# Patient Record
Sex: MALE | Race: WHITE | Employment: UNEMPLOYED | URBAN - METROPOLITAN AREA
[De-identification: names, ages, dates, MRNs, and addresses within clinical notes are randomized per-mention and may not be internally consistent; named-entity substitution may affect disease eponyms.]

---

## 2022-03-14 ENCOUNTER — APPOINTMENT (OUTPATIENT)
Dept: GENERAL RADIOLOGY | Age: 57
DRG: 241 | End: 2022-03-14
Attending: EMERGENCY MEDICINE
Payer: MEDICAID

## 2022-03-14 ENCOUNTER — APPOINTMENT (OUTPATIENT)
Dept: CT IMAGING | Age: 57
DRG: 241 | End: 2022-03-14
Attending: EMERGENCY MEDICINE
Payer: MEDICAID

## 2022-03-14 ENCOUNTER — APPOINTMENT (OUTPATIENT)
Dept: ULTRASOUND IMAGING | Age: 57
DRG: 241 | End: 2022-03-14
Attending: FAMILY MEDICINE
Payer: MEDICAID

## 2022-03-14 ENCOUNTER — APPOINTMENT (OUTPATIENT)
Dept: CT IMAGING | Age: 57
DRG: 241 | End: 2022-03-14
Attending: FAMILY MEDICINE
Payer: MEDICAID

## 2022-03-14 ENCOUNTER — HOSPITAL ENCOUNTER (INPATIENT)
Age: 57
LOS: 2 days | Discharge: HOME | DRG: 241 | End: 2022-03-16
Attending: EMERGENCY MEDICINE | Admitting: FAMILY MEDICINE
Payer: MEDICAID

## 2022-03-14 DIAGNOSIS — F10.939 ALCOHOL WITHDRAWAL SYNDROME WITH COMPLICATION (HCC): ICD-10-CM

## 2022-03-14 DIAGNOSIS — K92.2 GASTROINTESTINAL HEMORRHAGE, UNSPECIFIED GASTROINTESTINAL HEMORRHAGE TYPE: Primary | ICD-10-CM

## 2022-03-14 LAB
ABO + RH BLD: NORMAL
ALBUMIN SERPL-MCNC: 3.5 G/DL (ref 3.5–5)
ALBUMIN/GLOB SERPL: 0.9 {RATIO} (ref 1.1–2.2)
ALP SERPL-CCNC: 345 U/L (ref 45–117)
ALT SERPL-CCNC: 104 U/L (ref 12–78)
AMMONIA PLAS-SCNC: 45 UMOL/L
AMPHET UR QL SCN: NEGATIVE
ANION GAP SERPL CALC-SCNC: 20 MMOL/L (ref 5–15)
AST SERPL-CCNC: 161 U/L (ref 15–37)
BARBITURATES UR QL SCN: NEGATIVE
BASOPHILS # BLD: 0.1 K/UL (ref 0–0.1)
BASOPHILS NFR BLD: 1 % (ref 0–1)
BENZODIAZ UR QL: NEGATIVE
BILIRUB SERPL-MCNC: 2.3 MG/DL (ref 0.2–1)
BLOOD GROUP ANTIBODIES SERPL: NORMAL
BUN SERPL-MCNC: 10 MG/DL (ref 6–20)
BUN/CREAT SERPL: 11 (ref 12–20)
CALCIUM SERPL-MCNC: 8.5 MG/DL (ref 8.5–10.1)
CANNABINOIDS UR QL SCN: NEGATIVE
CHLORIDE SERPL-SCNC: 91 MMOL/L (ref 97–108)
CO2 SERPL-SCNC: 22 MMOL/L (ref 21–32)
COCAINE UR QL SCN: NEGATIVE
COMMENT, HOLDF: NORMAL
CREAT SERPL-MCNC: 0.94 MG/DL (ref 0.7–1.3)
DIFFERENTIAL METHOD BLD: ABNORMAL
DRUG SCRN COMMENT,DRGCM: NORMAL
EOSINOPHIL # BLD: 0 K/UL (ref 0–0.4)
EOSINOPHIL NFR BLD: 0 % (ref 0–7)
ERYTHROCYTE [DISTWIDTH] IN BLOOD BY AUTOMATED COUNT: 17.2 % (ref 11.5–14.5)
EST. AVERAGE GLUCOSE BLD GHB EST-MCNC: 174 MG/DL
ETHANOL SERPL-MCNC: 70 MG/DL
FOLATE SERPL-MCNC: 28 NG/ML (ref 5–21)
GLOBULIN SER CALC-MCNC: 4.1 G/DL (ref 2–4)
GLUCOSE BLD STRIP.AUTO-MCNC: 174 MG/DL (ref 65–117)
GLUCOSE BLD STRIP.AUTO-MCNC: 193 MG/DL (ref 65–117)
GLUCOSE SERPL-MCNC: 293 MG/DL (ref 65–100)
HBA1C MFR BLD: 7.7 % (ref 4–5.6)
HCT VFR BLD AUTO: 37.9 % (ref 36.6–50.3)
HGB BLD-MCNC: 10.8 G/DL (ref 12.1–17)
HGB BLD-MCNC: 11.9 G/DL (ref 12.1–17)
HGB BLD-MCNC: 13.2 G/DL (ref 12.1–17)
IMM GRANULOCYTES # BLD AUTO: 0 K/UL (ref 0–0.04)
IMM GRANULOCYTES NFR BLD AUTO: 0 % (ref 0–0.5)
INR PPP: 1.2 (ref 0.9–1.1)
LIPASE SERPL-CCNC: 174 U/L (ref 73–393)
LIPASE SERPL-CCNC: 179 U/L (ref 73–393)
LYMPHOCYTES # BLD: 0.9 K/UL (ref 0.8–3.5)
LYMPHOCYTES NFR BLD: 16 % (ref 12–49)
MAGNESIUM SERPL-MCNC: 1.2 MG/DL (ref 1.6–2.4)
MAGNESIUM SERPL-MCNC: 1.3 MG/DL (ref 1.6–2.4)
MCH RBC QN AUTO: 32.3 PG (ref 26–34)
MCHC RBC AUTO-ENTMCNC: 34.8 G/DL (ref 30–36.5)
MCV RBC AUTO: 92.7 FL (ref 80–99)
METHADONE UR QL: NEGATIVE
MONOCYTES # BLD: 0.5 K/UL (ref 0–1)
MONOCYTES NFR BLD: 9 % (ref 5–13)
NEUTS SEG # BLD: 4.4 K/UL (ref 1.8–8)
NEUTS SEG NFR BLD: 74 % (ref 32–75)
NRBC # BLD: 0.02 K/UL (ref 0–0.01)
NRBC BLD-RTO: 0.3 PER 100 WBC
OPIATES UR QL: NEGATIVE
PCP UR QL: NEGATIVE
PLATELET # BLD AUTO: 93 K/UL (ref 150–400)
PMV BLD AUTO: 9.4 FL (ref 8.9–12.9)
POTASSIUM SERPL-SCNC: 3.1 MMOL/L (ref 3.5–5.1)
PROT SERPL-MCNC: 7.6 G/DL (ref 6.4–8.2)
PROTHROMBIN TIME: 12.2 SEC (ref 9–11.1)
RBC # BLD AUTO: 4.09 M/UL (ref 4.1–5.7)
RBC MORPH BLD: ABNORMAL
SAMPLES BEING HELD,HOLD: NORMAL
SERVICE CMNT-IMP: ABNORMAL
SERVICE CMNT-IMP: ABNORMAL
SODIUM SERPL-SCNC: 133 MMOL/L (ref 136–145)
SPECIMEN EXP DATE BLD: NORMAL
WBC # BLD AUTO: 5.9 K/UL (ref 4.1–11.1)

## 2022-03-14 PROCEDURE — 83690 ASSAY OF LIPASE: CPT

## 2022-03-14 PROCEDURE — 74177 CT ABD & PELVIS W/CONTRAST: CPT

## 2022-03-14 PROCEDURE — 76705 ECHO EXAM OF ABDOMEN: CPT

## 2022-03-14 PROCEDURE — 74011250636 HC RX REV CODE- 250/636: Performed by: FAMILY MEDICINE

## 2022-03-14 PROCEDURE — 83735 ASSAY OF MAGNESIUM: CPT

## 2022-03-14 PROCEDURE — 82962 GLUCOSE BLOOD TEST: CPT

## 2022-03-14 PROCEDURE — 74011000250 HC RX REV CODE- 250: Performed by: FAMILY MEDICINE

## 2022-03-14 PROCEDURE — 80307 DRUG TEST PRSMV CHEM ANLYZR: CPT

## 2022-03-14 PROCEDURE — 94760 N-INVAS EAR/PLS OXIMETRY 1: CPT

## 2022-03-14 PROCEDURE — 71250 CT THORAX DX C-: CPT

## 2022-03-14 PROCEDURE — C9113 INJ PANTOPRAZOLE SODIUM, VIA: HCPCS | Performed by: FAMILY MEDICINE

## 2022-03-14 PROCEDURE — C9113 INJ PANTOPRAZOLE SODIUM, VIA: HCPCS | Performed by: EMERGENCY MEDICINE

## 2022-03-14 PROCEDURE — 74011250636 HC RX REV CODE- 250/636: Performed by: EMERGENCY MEDICINE

## 2022-03-14 PROCEDURE — 83036 HEMOGLOBIN GLYCOSYLATED A1C: CPT

## 2022-03-14 PROCEDURE — 85018 HEMOGLOBIN: CPT

## 2022-03-14 PROCEDURE — 74011250637 HC RX REV CODE- 250/637: Performed by: FAMILY MEDICINE

## 2022-03-14 PROCEDURE — 99285 EMERGENCY DEPT VISIT HI MDM: CPT

## 2022-03-14 PROCEDURE — 82140 ASSAY OF AMMONIA: CPT

## 2022-03-14 PROCEDURE — 71045 X-RAY EXAM CHEST 1 VIEW: CPT

## 2022-03-14 PROCEDURE — 80053 COMPREHEN METABOLIC PANEL: CPT

## 2022-03-14 PROCEDURE — 85025 COMPLETE CBC W/AUTO DIFF WBC: CPT

## 2022-03-14 PROCEDURE — 74011000636 HC RX REV CODE- 636: Performed by: RADIOLOGY

## 2022-03-14 PROCEDURE — 36415 COLL VENOUS BLD VENIPUNCTURE: CPT

## 2022-03-14 PROCEDURE — 82077 ASSAY SPEC XCP UR&BREATH IA: CPT

## 2022-03-14 PROCEDURE — 96375 TX/PRO/DX INJ NEW DRUG ADDON: CPT

## 2022-03-14 PROCEDURE — 74011000250 HC RX REV CODE- 250: Performed by: EMERGENCY MEDICINE

## 2022-03-14 PROCEDURE — 82746 ASSAY OF FOLIC ACID SERUM: CPT

## 2022-03-14 PROCEDURE — 65660000000 HC RM CCU STEPDOWN

## 2022-03-14 PROCEDURE — 96361 HYDRATE IV INFUSION ADD-ON: CPT

## 2022-03-14 PROCEDURE — 85610 PROTHROMBIN TIME: CPT

## 2022-03-14 PROCEDURE — 86900 BLOOD TYPING SEROLOGIC ABO: CPT

## 2022-03-14 PROCEDURE — 93005 ELECTROCARDIOGRAM TRACING: CPT

## 2022-03-14 PROCEDURE — 96374 THER/PROPH/DIAG INJ IV PUSH: CPT

## 2022-03-14 RX ORDER — CARVEDILOL 25 MG/1
25 TABLET ORAL 2 TIMES DAILY WITH MEALS
COMMUNITY

## 2022-03-14 RX ORDER — ONDANSETRON 2 MG/ML
4 INJECTION INTRAMUSCULAR; INTRAVENOUS
Status: DISCONTINUED | OUTPATIENT
Start: 2022-03-14 | End: 2022-03-16 | Stop reason: HOSPADM

## 2022-03-14 RX ORDER — FENTANYL CITRATE 50 UG/ML
50 INJECTION, SOLUTION INTRAMUSCULAR; INTRAVENOUS ONCE
Status: COMPLETED | OUTPATIENT
Start: 2022-03-14 | End: 2022-03-14

## 2022-03-14 RX ORDER — GUAIFENESIN 100 MG/5ML
81 LIQUID (ML) ORAL DAILY
COMMUNITY

## 2022-03-14 RX ORDER — SODIUM CHLORIDE 0.9 % (FLUSH) 0.9 %
5-40 SYRINGE (ML) INJECTION EVERY 8 HOURS
Status: DISCONTINUED | OUTPATIENT
Start: 2022-03-14 | End: 2022-03-16 | Stop reason: HOSPADM

## 2022-03-14 RX ORDER — INSULIN LISPRO 100 [IU]/ML
4 INJECTION, SOLUTION INTRAVENOUS; SUBCUTANEOUS
COMMUNITY

## 2022-03-14 RX ORDER — CLOPIDOGREL BISULFATE 75 MG/1
75 TABLET ORAL DAILY
COMMUNITY

## 2022-03-14 RX ORDER — MAGNESIUM SULFATE 100 %
4 CRYSTALS MISCELLANEOUS AS NEEDED
Status: DISCONTINUED | OUTPATIENT
Start: 2022-03-14 | End: 2022-03-16 | Stop reason: HOSPADM

## 2022-03-14 RX ORDER — LORAZEPAM 2 MG/ML
1 INJECTION INTRAMUSCULAR
Status: DISCONTINUED | OUTPATIENT
Start: 2022-03-14 | End: 2022-03-15

## 2022-03-14 RX ORDER — SODIUM CHLORIDE 0.9 % (FLUSH) 0.9 %
20 SYRINGE (ML) INJECTION ONCE
Status: COMPLETED | OUTPATIENT
Start: 2022-03-14 | End: 2022-03-14

## 2022-03-14 RX ORDER — PANTOPRAZOLE SODIUM 40 MG/10ML
80 INJECTION, POWDER, LYOPHILIZED, FOR SOLUTION INTRAVENOUS ONCE
Status: COMPLETED | OUTPATIENT
Start: 2022-03-14 | End: 2022-03-14

## 2022-03-14 RX ORDER — ONDANSETRON 2 MG/ML
4 INJECTION INTRAMUSCULAR; INTRAVENOUS
Status: COMPLETED | OUTPATIENT
Start: 2022-03-14 | End: 2022-03-14

## 2022-03-14 RX ORDER — LORAZEPAM 2 MG/ML
1 INJECTION INTRAMUSCULAR ONCE
Status: COMPLETED | OUTPATIENT
Start: 2022-03-14 | End: 2022-03-14

## 2022-03-14 RX ORDER — SODIUM CHLORIDE 0.9 % (FLUSH) 0.9 %
10 SYRINGE (ML) INJECTION EVERY 12 HOURS
Status: DISCONTINUED | OUTPATIENT
Start: 2022-03-14 | End: 2022-03-16 | Stop reason: HOSPADM

## 2022-03-14 RX ORDER — SODIUM CHLORIDE 9 MG/ML
100 INJECTION, SOLUTION INTRAVENOUS CONTINUOUS
Status: DISCONTINUED | OUTPATIENT
Start: 2022-03-14 | End: 2022-03-16 | Stop reason: HOSPADM

## 2022-03-14 RX ORDER — SODIUM CHLORIDE 0.9 % (FLUSH) 0.9 %
5-40 SYRINGE (ML) INJECTION AS NEEDED
Status: DISCONTINUED | OUTPATIENT
Start: 2022-03-14 | End: 2022-03-16 | Stop reason: HOSPADM

## 2022-03-14 RX ORDER — MORPHINE SULFATE 2 MG/ML
1 INJECTION, SOLUTION INTRAMUSCULAR; INTRAVENOUS ONCE
Status: COMPLETED | OUTPATIENT
Start: 2022-03-14 | End: 2022-03-14

## 2022-03-14 RX ORDER — CARVEDILOL 12.5 MG/1
25 TABLET ORAL 2 TIMES DAILY WITH MEALS
Status: DISCONTINUED | OUTPATIENT
Start: 2022-03-14 | End: 2022-03-16 | Stop reason: HOSPADM

## 2022-03-14 RX ORDER — INSULIN LISPRO 100 [IU]/ML
INJECTION, SOLUTION INTRAVENOUS; SUBCUTANEOUS EVERY 6 HOURS
Status: DISCONTINUED | OUTPATIENT
Start: 2022-03-14 | End: 2022-03-16 | Stop reason: HOSPADM

## 2022-03-14 RX ORDER — LISINOPRIL 20 MG/1
20 TABLET ORAL 2 TIMES DAILY
COMMUNITY

## 2022-03-14 RX ORDER — INSULIN GLARGINE 100 [IU]/ML
16 INJECTION, SOLUTION SUBCUTANEOUS DAILY
COMMUNITY

## 2022-03-14 RX ORDER — OMEPRAZOLE 20 MG/1
20 CAPSULE, DELAYED RELEASE ORAL
COMMUNITY
End: 2022-03-16

## 2022-03-14 RX ORDER — PANTOPRAZOLE SODIUM 40 MG/10ML
40 INJECTION, POWDER, LYOPHILIZED, FOR SOLUTION INTRAVENOUS EVERY 12 HOURS
Status: DISCONTINUED | OUTPATIENT
Start: 2022-03-14 | End: 2022-03-15

## 2022-03-14 RX ORDER — MULTIVITAMIN/IRON/FOLIC ACID 18MG-0.4MG
1 TABLET ORAL DAILY
COMMUNITY

## 2022-03-14 RX ORDER — MORPHINE SULFATE 2 MG/ML
2 INJECTION, SOLUTION INTRAMUSCULAR; INTRAVENOUS ONCE
Status: COMPLETED | OUTPATIENT
Start: 2022-03-14 | End: 2022-03-14

## 2022-03-14 RX ADMIN — ONDANSETRON HYDROCHLORIDE 4 MG: 2 SOLUTION INTRAMUSCULAR; INTRAVENOUS at 13:59

## 2022-03-14 RX ADMIN — FENTANYL CITRATE 50 MCG: 50 INJECTION, SOLUTION INTRAMUSCULAR; INTRAVENOUS at 09:38

## 2022-03-14 RX ADMIN — MORPHINE SULFATE 2 MG: 2 INJECTION, SOLUTION INTRAMUSCULAR; INTRAVENOUS at 20:14

## 2022-03-14 RX ADMIN — Medication 10 ML: at 22:10

## 2022-03-14 RX ADMIN — FOLIC ACID: 5 INJECTION, SOLUTION INTRAMUSCULAR; INTRAVENOUS; SUBCUTANEOUS at 17:52

## 2022-03-14 RX ADMIN — ONDANSETRON HYDROCHLORIDE 4 MG: 2 SOLUTION INTRAMUSCULAR; INTRAVENOUS at 21:27

## 2022-03-14 RX ADMIN — LORAZEPAM 1 MG: 2 INJECTION INTRAMUSCULAR; INTRAVENOUS at 18:48

## 2022-03-14 RX ADMIN — CARVEDILOL 25 MG: 12.5 TABLET, FILM COATED ORAL at 18:47

## 2022-03-14 RX ADMIN — ONDANSETRON HYDROCHLORIDE 4 MG: 2 SOLUTION INTRAMUSCULAR; INTRAVENOUS at 09:23

## 2022-03-14 RX ADMIN — Medication 10 ML: at 14:00

## 2022-03-14 RX ADMIN — SODIUM CHLORIDE 1000 ML: 9 INJECTION, SOLUTION INTRAVENOUS at 09:23

## 2022-03-14 RX ADMIN — LORAZEPAM 1 MG: 2 INJECTION INTRAMUSCULAR; INTRAVENOUS at 12:20

## 2022-03-14 RX ADMIN — IOPAMIDOL 100 ML: 755 INJECTION, SOLUTION INTRAVENOUS at 11:18

## 2022-03-14 RX ADMIN — SODIUM CHLORIDE, PRESERVATIVE FREE 10 ML: 5 INJECTION INTRAVENOUS at 20:15

## 2022-03-14 RX ADMIN — PANTOPRAZOLE SODIUM 40 MG: 40 INJECTION, POWDER, FOR SOLUTION INTRAVENOUS at 20:14

## 2022-03-14 RX ADMIN — PANTOPRAZOLE SODIUM 80 MG: 40 INJECTION, POWDER, FOR SOLUTION INTRAVENOUS at 09:23

## 2022-03-14 RX ADMIN — LORAZEPAM 1 MG: 2 INJECTION INTRAMUSCULAR; INTRAVENOUS at 23:44

## 2022-03-14 RX ADMIN — SODIUM CHLORIDE 100 ML/HR: 9 INJECTION, SOLUTION INTRAVENOUS at 15:53

## 2022-03-14 RX ADMIN — LORAZEPAM 1 MG: 2 INJECTION INTRAMUSCULAR; INTRAVENOUS at 09:23

## 2022-03-14 RX ADMIN — MORPHINE SULFATE 1 MG: 2 INJECTION, SOLUTION INTRAMUSCULAR; INTRAVENOUS at 13:59

## 2022-03-14 RX ADMIN — SODIUM CHLORIDE, PRESERVATIVE FREE 20 ML: 5 INJECTION INTRAVENOUS at 09:24

## 2022-03-14 NOTE — PROGRESS NOTES
Problem: Falls - Risk of  Goal: *Absence of Falls  Description: Document Shady Diaz Fall Risk and appropriate interventions in the flowsheet.   Outcome: Progressing Towards Goal  Note: Fall Risk Interventions:                                Problem: Patient Education: Go to Patient Education Activity  Goal: Patient/Family Education  Outcome: Progressing Towards Goal

## 2022-03-14 NOTE — ED PROVIDER NOTES
History of coronary disease status post MI and stent, alcoholism, pancreatitis. He presents with complaints of a 15-hour history of hematemesis. He states that he began vomiting last night about 6 PM.  He reports multiple episodes of brownish/maroon-colored emesis that he states is bloody. His stools have been \"a little darker. \"  He reports drinking alcohol daily. His last drink was last evening. He complains of \"severe\" abdominal pain that he attributes to all of the vomiting. He questions whether he could have pancreatitis. He states that he is currently staying with his brother and is from out of town. He denies a history of GI bleeding. He denies a history of previous hospitalizations for alcohol withdrawal.  He states that he was hospitalized once for pancreatitis. He is on Plavix. No past medical history on file. No past surgical history on file. No family history on file. Social History     Socioeconomic History    Marital status: Not on file     Spouse name: Not on file    Number of children: Not on file    Years of education: Not on file    Highest education level: Not on file   Occupational History    Not on file   Tobacco Use    Smoking status: Not on file    Smokeless tobacco: Not on file   Substance and Sexual Activity    Alcohol use: Not on file    Drug use: Not on file    Sexual activity: Not on file   Other Topics Concern    Not on file   Social History Narrative    Not on file     Social Determinants of Health     Financial Resource Strain:     Difficulty of Paying Living Expenses: Not on file   Food Insecurity:     Worried About Running Out of Food in the Last Year: Not on file    Yanelis of Food in the Last Year: Not on file   Transportation Needs:     Lack of Transportation (Medical): Not on file    Lack of Transportation (Non-Medical):  Not on file   Physical Activity:     Days of Exercise per Week: Not on file    Minutes of Exercise per Session: Not on file   Stress:     Feeling of Stress : Not on file   Social Connections:     Frequency of Communication with Friends and Family: Not on file    Frequency of Social Gatherings with Friends and Family: Not on file    Attends Christian Services: Not on file    Active Member of Clubs or Organizations: Not on file    Attends Club or Organization Meetings: Not on file    Marital Status: Not on file   Intimate Partner Violence:     Fear of Current or Ex-Partner: Not on file    Emotionally Abused: Not on file    Physically Abused: Not on file    Sexually Abused: Not on file   Housing Stability:     Unable to Pay for Housing in the Last Year: Not on file    Number of [place] in the Last Year: Not on file    Unstable Housing in the Last Year: Not on file         ALLERGIES: Penicillins    Review of Systems   All other systems reviewed and are negative. Vitals:    03/14/22 0854   BP: (!) 154/100   Pulse: (!) 124   Resp: 25   Temp: 98.3 °F (36.8 °C)   SpO2: 95%   Weight: 80.9 kg (178 lb 5.6 oz)   Height: 5' 9\" (1.753 m)            Physical Exam  Vitals and nursing note reviewed. Constitutional:       Appearance: He is well-developed. Comments: Appears uncomfortable. Tremulous. HENT:      Head: Normocephalic and atraumatic. Eyes:      Conjunctiva/sclera: Conjunctivae normal.   Neck:      Trachea: No tracheal deviation. Cardiovascular:      Rate and Rhythm: Regular rhythm. Tachycardia present. Heart sounds: Normal heart sounds. No murmur heard. No friction rub. No gallop. Pulmonary:      Effort: Pulmonary effort is normal.      Breath sounds: Normal breath sounds. Abdominal:      Palpations: Abdomen is soft. Tenderness: There is generalized abdominal tenderness. Musculoskeletal:         General: No deformity. Cervical back: Neck supple. Skin:     General: Skin is warm and dry. Comments: Tremulous. Neurological:      Mental Status: He is alert. Comments: oriented          MDM       Procedures    EKG: Sinus tachycardia; rate of 125; inferior and anteroseptal Q waves. Carlos Daily MD  9:33 AM    Perfect Serve Consult for Admission  11:48 AM    ED Room Number: ER09/09  Patient Name and age:  Megha Verduzco 60 y.o.  male  Working Diagnosis: Upper GI bleeding/alcohol withdrawal  COVID-19 Suspicion:  no  Sepsis present:  no  Reassessment needed: no  Code Status:  Full Code  Readmission: no  Isolation Requirements:  no  Recommended Level of Care:  step down  Department:[facility] Adult ED - 21   Other: Reports developing hematemesis last night starting about 6 PM.  He is an alcoholic and appears to be in withdrawal.  Hemodynamically stable (tachycardic and hypertensive). Hemoglobin is stable. Protonix, Ativan, IV fluids. Total critical care time spent exclusive of procedures:  34 min. Carlos Daily MD  3:06 PM      Consult note: Dr. Santhosh Barr -will arrange admission.   Carlos Daily MD

## 2022-03-14 NOTE — PROGRESS NOTES
Pharmacist Admission Medication Reconciliation:    Information obtained from: This medication history was obtained from the patient. He appears to be an accurate historian. RxQuery data available¹:  NO insurance claims - but data from other healthcare systems is available    Summary:     No medications were previously on record for this patient ([facility] encounter). ¹RxQuery pharmacy benefit data reflects medications processed through the patient's insurance, however this data does NOT capture whether the medication is currently being taken by the patient. Allergies:  Penicillins    Significant PMH/Disease States: No past medical history on file. Chief Complaint for this Admission:    Chief Complaint   Patient presents with    Abdominal Pain     Prior to Admission Medications:   Prior to Admission Medications   Prescriptions Last Dose Informant Patient Reported? Taking?   aspirin 81 mg chewable tablet 3/12/2022  Yes Yes   Sig: Take 81 mg by mouth daily. carvediloL (Coreg) 25 mg tablet 3/12/2022  Yes Yes   Sig: Take 25 mg by mouth two (2) times daily (with meals). clopidogreL (Plavix) 75 mg tab 3/12/2022  Yes Yes   Sig: Take 75 mg by mouth daily. insulin glargine (Lantus U-100 Insulin) 100 unit/mL injection 3/12/2022  Yes Yes   Si Units by SubCUTAneous route daily. insulin lispro (HumaLOG KwikPen Insulin) 100 unit/mL kwikpen 3/7/2022  Yes Yes   Si Units by SubCUTAneous route Before breakfast, lunch, and dinner. lisinopriL (PRINIVIL, ZESTRIL) 20 mg tablet 3/12/2022  Yes Yes   Sig: Take 20 mg by mouth two (2) times a day. multivitamin-iron-FA, hematinic, (Cerovite Advanced Formula)  mg-mcg tab tablet 3/12/2022  Yes Yes   Sig: Take 1 Tablet by mouth daily. omeprazole (PRILOSEC) 20 mg capsule 3/12/2022  Yes Yes   Sig: Take 20 mg by mouth Daily (before breakfast).       Facility-Administered Medications: None     Thank you for allowing me to participate in this patient's care.  Please call  or  with any questions. Neema Fox, Pharm. D., BCPS, BCPPS

## 2022-03-14 NOTE — ROUTINE PROCESS
TRANSFER - OUT REPORT:    Verbal report given to Gina(name) on Meet Barnes  being transferred to (unit) for routine progression of care       Report consisted of patients Situation, Background, Assessment and   Recommendations(SBAR). Information from the following report(s) SBAR, ED Summary, Procedure Summary, MAR and Recent Results was reviewed with the receiving nurse. Lines:   Peripheral IV 03/14/22 Right Antecubital (Active)   Site Assessment Clean, dry, & intact 03/14/22 0917   Phlebitis Assessment 0 03/14/22 0917   Infiltration Assessment 0 03/14/22 0917   Dressing Status Clean, dry, & intact 03/14/22 0917   Dressing Type Transparent 03/14/22 0917   Hub Color/Line Status Pink 03/14/22 1519        Opportunity for questions and clarification was provided.       Patient transported with:   "[employer]

## 2022-03-14 NOTE — H&P
[address] [facility] Adult  Hospitalist Group  History and Physical    Date of Service:  3/14/2022  Primary Care Provider: None  Source of information: The patient    Chief Complaint: Abdominal Pain      History of Presenting Illness:   Ana Arnold is a 60 y.o. male who presents with nausea vomiting    History was primarily obtained from the patient    Patient reports that he drinks heavy amount of alcohol on a daily basis has history of pancreatitis, last drink was around 430 yesterday, started having some nausea vomiting reports that he has noticed some coffee-ground emesis and noticed that his stool is dark, reports that he is also noticed some fresh blood in his vomitus, denies any fever or chills, denies any lightheaded dizziness or falls, patient came to the ER and was requested to be admitted to the hospitalist service, patient reports he supposed to take aspirin and Plavix on a daily basis    The patient denies any headache, blurry vision, sore throat, trouble swallowing, trouble with speech, chest pain, SOB, cough, fever, chills, , urinary symptoms, constipation, recent travels, sick contacts, focal or generalized neurological symptoms, falls, injuries, rashes, contact with COVID-19 diagnosed patients, , hemoptysis, hematuria, rashes, denies starting any new medications and denies any other concerns or problems besides as mentioned above. REVIEW OF SYSTEMS:  A comprehensive review of systems was negative except for that written in the History of Present Illness. No past medical history on file. No past surgical history on file. Prior to Admission medications    Medication Sig Start Date End Date Taking? Authorizing Provider   aspirin 81 mg chewable tablet Take 81 mg by mouth daily. Yes Provider, Historical   carvediloL (Coreg) 25 mg tablet Take 25 mg by mouth two (2) times daily (with meals). Yes Provider, Historical   clopidogreL (Plavix) 75 mg tab Take 75 mg by mouth daily.    Yes Provider, Historical   insulin glargine (Lantus U-100 Insulin) 100 unit/mL injection 16 Units by SubCUTAneous route daily. Yes Provider, Historical   insulin lispro (HumaLOG KwikPen Insulin) 100 unit/mL kwikpen 4 Units by SubCUTAneous route Before breakfast, lunch, and dinner. Yes Provider, Historical   lisinopriL (PRINIVIL, ZESTRIL) 20 mg tablet Take 20 mg by mouth two (2) times a day. Yes Provider, Historical   multivitamin-iron-FA, hematinic, (Cerovite Advanced Formula)  mg-mcg tab tablet Take 1 Tablet by mouth daily. Yes Provider, Historical   omeprazole (PRILOSEC) 20 mg capsule Take 20 mg by mouth Daily (before breakfast). Yes Provider, Historical     Allergies   Allergen Reactions    Penicillins Anaphylaxis      No family history on file. Social History:       Family and social history were personally reviewed, all pertinent and relevant details are outlined as above. Objective:     Visit Vitals  /89   Pulse (!) 119   Temp 98.4 °F (36.9 °C)   Resp 17   Ht 5' 9\" (1.753 m)   Wt 80.9 kg (178 lb 5.6 oz)   SpO2 98%   BMI 26.34 kg/m²      O2 Device: None (Room air)    PHYSICAL EXAM:   General: Alert, awake, tremulous man  HEENT: PEERL, EOMI, moist mucus membranes  Neck: Supple, no JVD, no meningeal signs  Chest: Clear to auscultation bilaterally   CVS: Tachycardic  Abd: Soft, non-tender, non-distended, +bowel sounds   Ext: No clubbing, no cyanosis, no edema  Neuro/Psych: No focal neurological deficit  Cap refill: Brisk, less than 3 seconds  Pulses: 2+, symmetric in all extremities  Skin: Warm, dry, without rashes or lesions    Data Review: All diagnostic labs and studies have been reviewed.     Abnormal Labs Reviewed   CBC WITH AUTOMATED DIFF - Abnormal; Notable for the following components:       Result Value    RBC 4.09 (*)     RDW 17.2 (*)     PLATELET 93 (*)     NRBC 0.3 (*)     ABSOLUTE NRBC 0.02 (*)     All other components within normal limits   METABOLIC PANEL, COMPREHENSIVE - Abnormal; Notable for the following components:    Sodium 133 (*)     Potassium 3.1 (*)     Chloride 91 (*)     Anion gap 20 (*)     Glucose 293 (*)     BUN/Creatinine ratio 11 (*)     Bilirubin, total 2.3 (*)     ALT (SGPT) 104 (*)     AST (SGOT) 161 (*)     Alk. phosphatase 345 (*)     Globulin 4.1 (*)     A-G Ratio 0.9 (*)     All other components within normal limits   MAGNESIUM - Abnormal; Notable for the following components:    Magnesium 1.2 (*)     All other components within normal limits   ETHYL ALCOHOL - Abnormal; Notable for the following components:    ALCOHOL(ETHYL),SERUM 70 (*)     All other components within normal limits       All Micro Results     None          IMAGING:   CT ABD PELV W CONT   Final Result   Marked hepatic steatosis. No acute abnormality. Punctate nonobstructing right   renal stones. XR CHEST PORT   Final Result   No acute process. ECG/ECHO:    Results for orders placed or performed during the hospital encounter of 03/14/22   EKG, 12 LEAD, INITIAL   Result Value Ref Range    Ventricular Rate 125 BPM    QRS Duration 82 ms    Q-T Interval 418 ms    QTC Calculation (Bezet) 603 ms    Calculated R Axis -126 degrees    Calculated T Axis 21 degrees    Diagnosis        Critical Test Result: Long QTc  Accelerated Junctional rhythm with occasional premature ventricular complexes  Right superior axis deviation  Inferior infarct , age undetermined  Anteroseptal infarct , age undetermined  Abnormal ECG  No previous ECGs available          Assessment:   Given the patient's current clinical presentation, there is a high level of concern for decompensation if discharged from the emergency department. Complex decision making was performed, which includes reviewing the patient's available past medical records, laboratory results, and imaging studies.     GI bleed  Alcohol abuse/alcohol withdrawal  Hypokalemia  Elevated LFTs    Plan:   Patient will be admitted on a telemetry bed  Likely secondary to alcoholic gastritis versus peptic ulcer disease, IV Protonix, n.p.o., IV hydration, H&H every 8 hours, transfuse PRBC as needed, further intervention per hospital course  CIWA protocol, seizure precautions, close monitoring patient appears to be going through withdrawals  Replace potassium  Likely secondary to alcoholic liver disease, right upper quadrant ultrasound, trend, continue to monitor, no obvious signs of pancreatitis        DIET: DIET NPO   ISOLATION PRECAUTIONS: There are currently no Active Isolations  CODE STATUS: Full Code   DVT PROPHYLAXIS: SCDs  FUNCTIONAL STATUS PRIOR TO HOSPITALIZATION: Fully active and ambulatory; able to carry on all self-care without restriction. EARLY MOBILITY ASSESSMENT: Recommend routine ambulation while hospitalized with the assistance of nursing staff  ANTICIPATED DISCHARGE: Greater than 48 hours. Signed By: Chavez Casper MD     March 14, 2022         Please note that this dictation may have been completed with Dragon, the [employer] voice recognition software. Quite often unanticipated grammatical, syntax, homophones, and other interpretive errors are inadvertently transcribed by the computer software. Please disregard these errors. Please excuse any errors that have escaped final proofreading.

## 2022-03-15 ENCOUNTER — ANESTHESIA EVENT (OUTPATIENT)
Dept: ENDOSCOPY | Age: 57
DRG: 241 | End: 2022-03-15
Payer: MEDICAID

## 2022-03-15 ENCOUNTER — ANESTHESIA (OUTPATIENT)
Dept: ENDOSCOPY | Age: 57
DRG: 241 | End: 2022-03-15
Payer: MEDICAID

## 2022-03-15 LAB
ALBUMIN SERPL-MCNC: 2.6 G/DL (ref 3.5–5)
ALBUMIN/GLOB SERPL: 0.8 {RATIO} (ref 1.1–2.2)
ALP SERPL-CCNC: 241 U/L (ref 45–117)
ALT SERPL-CCNC: 63 U/L (ref 12–78)
ANION GAP SERPL CALC-SCNC: 7 MMOL/L (ref 5–15)
AST SERPL-CCNC: 83 U/L (ref 15–37)
BASOPHILS # BLD: 0 K/UL (ref 0–0.1)
BASOPHILS NFR BLD: 1 % (ref 0–1)
BILIRUB SERPL-MCNC: 2.6 MG/DL (ref 0.2–1)
BUN SERPL-MCNC: 10 MG/DL (ref 6–20)
BUN/CREAT SERPL: 15 (ref 12–20)
CALCIUM SERPL-MCNC: 7.3 MG/DL (ref 8.5–10.1)
CALCULATED R AXIS, ECG10: -126 DEGREES
CALCULATED T AXIS, ECG11: 21 DEGREES
CHLORIDE SERPL-SCNC: 95 MMOL/L (ref 97–108)
CO2 SERPL-SCNC: 30 MMOL/L (ref 21–32)
COVID-19 RAPID TEST, COVR: NOT DETECTED
CREAT SERPL-MCNC: 0.68 MG/DL (ref 0.7–1.3)
DIAGNOSIS, 93000: NORMAL
DIFFERENTIAL METHOD BLD: ABNORMAL
EOSINOPHIL # BLD: 0 K/UL (ref 0–0.4)
EOSINOPHIL NFR BLD: 1 % (ref 0–7)
ERYTHROCYTE [DISTWIDTH] IN BLOOD BY AUTOMATED COUNT: 16.4 % (ref 11.5–14.5)
GLOBULIN SER CALC-MCNC: 3.3 G/DL (ref 2–4)
GLUCOSE BLD STRIP.AUTO-MCNC: 180 MG/DL (ref 65–117)
GLUCOSE BLD STRIP.AUTO-MCNC: 193 MG/DL (ref 65–117)
GLUCOSE BLD STRIP.AUTO-MCNC: 225 MG/DL (ref 65–117)
GLUCOSE SERPL-MCNC: 167 MG/DL (ref 65–100)
HCT VFR BLD AUTO: 29.9 % (ref 36.6–50.3)
HGB BLD-MCNC: 10.5 G/DL (ref 12.1–17)
HGB BLD-MCNC: 10.6 G/DL (ref 12.1–17)
IMM GRANULOCYTES # BLD AUTO: 0 K/UL (ref 0–0.04)
IMM GRANULOCYTES NFR BLD AUTO: 1 % (ref 0–0.5)
LYMPHOCYTES # BLD: 0.9 K/UL (ref 0.8–3.5)
LYMPHOCYTES NFR BLD: 27 % (ref 12–49)
MCH RBC QN AUTO: 33.4 PG (ref 26–34)
MCHC RBC AUTO-ENTMCNC: 35.5 G/DL (ref 30–36.5)
MCV RBC AUTO: 94.3 FL (ref 80–99)
MONOCYTES # BLD: 0.3 K/UL (ref 0–1)
MONOCYTES NFR BLD: 10 % (ref 5–13)
NEUTS SEG # BLD: 2 K/UL (ref 1.8–8)
NEUTS SEG NFR BLD: 60 % (ref 32–75)
NRBC # BLD: 0.04 K/UL (ref 0–0.01)
NRBC BLD-RTO: 1.3 PER 100 WBC
PLATELET # BLD AUTO: 48 K/UL (ref 150–400)
PMV BLD AUTO: 9.8 FL (ref 8.9–12.9)
POTASSIUM SERPL-SCNC: 2.8 MMOL/L (ref 3.5–5.1)
PROT SERPL-MCNC: 5.9 G/DL (ref 6.4–8.2)
Q-T INTERVAL, ECG07: 418 MS
QRS DURATION, ECG06: 82 MS
QTC CALCULATION (BEZET), ECG08: 603 MS
RBC # BLD AUTO: 3.17 M/UL (ref 4.1–5.7)
RBC MORPH BLD: ABNORMAL
RBC MORPH BLD: ABNORMAL
SERVICE CMNT-IMP: ABNORMAL
SODIUM SERPL-SCNC: 132 MMOL/L (ref 136–145)
SOURCE, COVRS: NORMAL
VENTRICULAR RATE, ECG03: 125 BPM
WBC # BLD AUTO: 3.2 K/UL (ref 4.1–11.1)

## 2022-03-15 PROCEDURE — 77030021593 HC FCPS BIOP ENDOSC BSC -A: Performed by: INTERNAL MEDICINE

## 2022-03-15 PROCEDURE — 65660000000 HC RM CCU STEPDOWN

## 2022-03-15 PROCEDURE — 85018 HEMOGLOBIN: CPT

## 2022-03-15 PROCEDURE — 74011250636 HC RX REV CODE- 250/636: Performed by: FAMILY MEDICINE

## 2022-03-15 PROCEDURE — 87635 SARS-COV-2 COVID-19 AMP PRB: CPT

## 2022-03-15 PROCEDURE — 80053 COMPREHEN METABOLIC PANEL: CPT

## 2022-03-15 PROCEDURE — 74011250636 HC RX REV CODE- 250/636: Performed by: NURSE ANESTHETIST, CERTIFIED REGISTERED

## 2022-03-15 PROCEDURE — 36415 COLL VENOUS BLD VENIPUNCTURE: CPT

## 2022-03-15 PROCEDURE — 82962 GLUCOSE BLOOD TEST: CPT

## 2022-03-15 PROCEDURE — 74011000250 HC RX REV CODE- 250: Performed by: FAMILY MEDICINE

## 2022-03-15 PROCEDURE — 74011250636 HC RX REV CODE- 250/636: Performed by: NURSE PRACTITIONER

## 2022-03-15 PROCEDURE — 0DJ08ZZ INSPECTION OF UPPER INTESTINAL TRACT, VIA NATURAL OR ARTIFICIAL OPENING ENDOSCOPIC: ICD-10-PCS | Performed by: INTERNAL MEDICINE

## 2022-03-15 PROCEDURE — 74011250637 HC RX REV CODE- 250/637: Performed by: FAMILY MEDICINE

## 2022-03-15 PROCEDURE — 74011250636 HC RX REV CODE- 250/636: Performed by: INTERNAL MEDICINE

## 2022-03-15 PROCEDURE — 76060000031 HC ANESTHESIA FIRST 0.5 HR: Performed by: INTERNAL MEDICINE

## 2022-03-15 PROCEDURE — 2709999900 HC NON-CHARGEABLE SUPPLY: Performed by: INTERNAL MEDICINE

## 2022-03-15 PROCEDURE — 74011250637 HC RX REV CODE- 250/637: Performed by: INTERNAL MEDICINE

## 2022-03-15 PROCEDURE — 85025 COMPLETE CBC W/AUTO DIFF WBC: CPT

## 2022-03-15 PROCEDURE — C9113 INJ PANTOPRAZOLE SODIUM, VIA: HCPCS | Performed by: FAMILY MEDICINE

## 2022-03-15 PROCEDURE — 74011000250 HC RX REV CODE- 250: Performed by: NURSE ANESTHETIST, CERTIFIED REGISTERED

## 2022-03-15 PROCEDURE — 74011000250 HC RX REV CODE- 250: Performed by: INTERNAL MEDICINE

## 2022-03-15 PROCEDURE — 74011636637 HC RX REV CODE- 636/637: Performed by: FAMILY MEDICINE

## 2022-03-15 PROCEDURE — 76040000019: Performed by: INTERNAL MEDICINE

## 2022-03-15 RX ORDER — DEXTROMETHORPHAN/PSEUDOEPHED 2.5-7.5/.8
1.2 DROPS ORAL
Status: DISCONTINUED | OUTPATIENT
Start: 2022-03-15 | End: 2022-03-15 | Stop reason: HOSPADM

## 2022-03-15 RX ORDER — PANTOPRAZOLE SODIUM 40 MG/1
40 TABLET, DELAYED RELEASE ORAL
Status: DISCONTINUED | OUTPATIENT
Start: 2022-03-15 | End: 2022-03-16 | Stop reason: HOSPADM

## 2022-03-15 RX ORDER — POTASSIUM CHLORIDE 14.9 MG/ML
10 INJECTION INTRAVENOUS
Status: COMPLETED | OUTPATIENT
Start: 2022-03-15 | End: 2022-03-15

## 2022-03-15 RX ORDER — FENTANYL CITRATE 50 UG/ML
25-200 INJECTION, SOLUTION INTRAMUSCULAR; INTRAVENOUS
Status: DISCONTINUED | OUTPATIENT
Start: 2022-03-15 | End: 2022-03-15 | Stop reason: HOSPADM

## 2022-03-15 RX ORDER — EPINEPHRINE 0.1 MG/ML
1 INJECTION INTRACARDIAC; INTRAVENOUS
Status: DISCONTINUED | OUTPATIENT
Start: 2022-03-15 | End: 2022-03-15 | Stop reason: HOSPADM

## 2022-03-15 RX ORDER — LIDOCAINE HYDROCHLORIDE 20 MG/ML
INJECTION, SOLUTION EPIDURAL; INFILTRATION; INTRACAUDAL; PERINEURAL AS NEEDED
Status: DISCONTINUED | OUTPATIENT
Start: 2022-03-15 | End: 2022-03-15 | Stop reason: HOSPADM

## 2022-03-15 RX ORDER — PROPOFOL 10 MG/ML
INJECTION, EMULSION INTRAVENOUS AS NEEDED
Status: DISCONTINUED | OUTPATIENT
Start: 2022-03-15 | End: 2022-03-15 | Stop reason: HOSPADM

## 2022-03-15 RX ORDER — SODIUM CHLORIDE 0.9 % (FLUSH) 0.9 %
5-40 SYRINGE (ML) INJECTION AS NEEDED
Status: DISCONTINUED | OUTPATIENT
Start: 2022-03-15 | End: 2022-03-16 | Stop reason: HOSPADM

## 2022-03-15 RX ORDER — LORAZEPAM 2 MG/ML
4 INJECTION INTRAMUSCULAR
Status: DISCONTINUED | OUTPATIENT
Start: 2022-03-15 | End: 2022-03-16 | Stop reason: HOSPADM

## 2022-03-15 RX ORDER — SUCRALFATE 1 G/10ML
1 SUSPENSION ORAL 4 TIMES DAILY
Status: DISCONTINUED | OUTPATIENT
Start: 2022-03-15 | End: 2022-03-15 | Stop reason: CLARIF

## 2022-03-15 RX ORDER — MIDAZOLAM HYDROCHLORIDE 1 MG/ML
.25-5 INJECTION, SOLUTION INTRAMUSCULAR; INTRAVENOUS
Status: DISCONTINUED | OUTPATIENT
Start: 2022-03-15 | End: 2022-03-15 | Stop reason: HOSPADM

## 2022-03-15 RX ORDER — ACETAMINOPHEN 325 MG/1
650 TABLET ORAL
Status: DISCONTINUED | OUTPATIENT
Start: 2022-03-15 | End: 2022-03-16 | Stop reason: HOSPADM

## 2022-03-15 RX ORDER — SODIUM CHLORIDE 0.9 % (FLUSH) 0.9 %
5-40 SYRINGE (ML) INJECTION EVERY 8 HOURS
Status: DISCONTINUED | OUTPATIENT
Start: 2022-03-15 | End: 2022-03-16 | Stop reason: HOSPADM

## 2022-03-15 RX ORDER — SUCRALFATE 1 G/1
1 TABLET ORAL
Status: DISCONTINUED | OUTPATIENT
Start: 2022-03-15 | End: 2022-03-16 | Stop reason: HOSPADM

## 2022-03-15 RX ORDER — SODIUM CHLORIDE 9 MG/ML
INJECTION, SOLUTION INTRAVENOUS
Status: DISCONTINUED | OUTPATIENT
Start: 2022-03-15 | End: 2022-03-15 | Stop reason: HOSPADM

## 2022-03-15 RX ORDER — SODIUM CHLORIDE 9 MG/ML
50 INJECTION, SOLUTION INTRAVENOUS CONTINUOUS
Status: DISPENSED | OUTPATIENT
Start: 2022-03-15 | End: 2022-03-15

## 2022-03-15 RX ORDER — ATROPINE SULFATE 0.1 MG/ML
0.5 INJECTION INTRAVENOUS
Status: DISCONTINUED | OUTPATIENT
Start: 2022-03-15 | End: 2022-03-15 | Stop reason: HOSPADM

## 2022-03-15 RX ORDER — NALOXONE HYDROCHLORIDE 0.4 MG/ML
0.4 INJECTION, SOLUTION INTRAMUSCULAR; INTRAVENOUS; SUBCUTANEOUS
Status: DISCONTINUED | OUTPATIENT
Start: 2022-03-15 | End: 2022-03-15 | Stop reason: HOSPADM

## 2022-03-15 RX ORDER — LORAZEPAM 2 MG/ML
2 INJECTION INTRAMUSCULAR
Status: DISCONTINUED | OUTPATIENT
Start: 2022-03-15 | End: 2022-03-16 | Stop reason: HOSPADM

## 2022-03-15 RX ORDER — FLUMAZENIL 0.1 MG/ML
0.2 INJECTION INTRAVENOUS
Status: DISCONTINUED | OUTPATIENT
Start: 2022-03-15 | End: 2022-03-15 | Stop reason: HOSPADM

## 2022-03-15 RX ADMIN — POTASSIUM CHLORIDE 10 MEQ: 14.9 INJECTION, SOLUTION INTRAVENOUS at 22:51

## 2022-03-15 RX ADMIN — Medication 10 ML: at 21:31

## 2022-03-15 RX ADMIN — SODIUM CHLORIDE, PRESERVATIVE FREE 10 ML: 5 INJECTION INTRAVENOUS at 17:56

## 2022-03-15 RX ADMIN — PANTOPRAZOLE SODIUM 40 MG: 40 INJECTION, POWDER, FOR SOLUTION INTRAVENOUS at 09:00

## 2022-03-15 RX ADMIN — LIDOCAINE HYDROCHLORIDE 100 MG: 20 INJECTION, SOLUTION EPIDURAL; INFILTRATION; INTRACAUDAL; PERINEURAL at 16:54

## 2022-03-15 RX ADMIN — PANTOPRAZOLE SODIUM 40 MG: 40 TABLET, DELAYED RELEASE ORAL at 17:51

## 2022-03-15 RX ADMIN — SUCRALFATE 1 G: 1 TABLET ORAL at 21:07

## 2022-03-15 RX ADMIN — CARVEDILOL 25 MG: 12.5 TABLET, FILM COATED ORAL at 18:03

## 2022-03-15 RX ADMIN — FOLIC ACID: 5 INJECTION, SOLUTION INTRAMUSCULAR; INTRAVENOUS; SUBCUTANEOUS at 17:55

## 2022-03-15 RX ADMIN — PROPOFOL 50 MG: 10 INJECTION, EMULSION INTRAVENOUS at 16:57

## 2022-03-15 RX ADMIN — PROPOFOL 100 MG: 10 INJECTION, EMULSION INTRAVENOUS at 16:54

## 2022-03-15 RX ADMIN — Medication 2 UNITS: at 14:39

## 2022-03-15 RX ADMIN — ONDANSETRON HYDROCHLORIDE 4 MG: 2 SOLUTION INTRAMUSCULAR; INTRAVENOUS at 09:12

## 2022-03-15 RX ADMIN — POTASSIUM CHLORIDE 10 MEQ: 14.9 INJECTION, SOLUTION INTRAVENOUS at 21:29

## 2022-03-15 RX ADMIN — LORAZEPAM 2 MG: 2 INJECTION INTRAMUSCULAR; INTRAVENOUS at 06:01

## 2022-03-15 RX ADMIN — ACETAMINOPHEN 650 MG: 325 TABLET ORAL at 09:00

## 2022-03-15 RX ADMIN — LORAZEPAM 4 MG: 2 INJECTION INTRAMUSCULAR; INTRAVENOUS at 09:06

## 2022-03-15 RX ADMIN — SODIUM CHLORIDE, PRESERVATIVE FREE 10 ML: 5 INJECTION INTRAVENOUS at 21:09

## 2022-03-15 RX ADMIN — POTASSIUM CHLORIDE 10 MEQ: 14.9 INJECTION, SOLUTION INTRAVENOUS at 20:06

## 2022-03-15 RX ADMIN — POTASSIUM CHLORIDE 10 MEQ: 14.9 INJECTION, SOLUTION INTRAVENOUS at 18:57

## 2022-03-15 RX ADMIN — CARVEDILOL 25 MG: 12.5 TABLET, FILM COATED ORAL at 09:00

## 2022-03-15 RX ADMIN — SODIUM CHLORIDE, PRESERVATIVE FREE 10 ML: 5 INJECTION INTRAVENOUS at 09:00

## 2022-03-15 RX ADMIN — ONDANSETRON HYDROCHLORIDE 4 MG: 2 SOLUTION INTRAMUSCULAR; INTRAVENOUS at 21:07

## 2022-03-15 RX ADMIN — Medication 10 ML: at 05:27

## 2022-03-15 RX ADMIN — SODIUM CHLORIDE: 900 INJECTION, SOLUTION INTRAVENOUS at 16:44

## 2022-03-15 RX ADMIN — PROPOFOL 50 MG: 10 INJECTION, EMULSION INTRAVENOUS at 16:55

## 2022-03-15 RX ADMIN — Medication 10 ML: at 14:00

## 2022-03-15 NOTE — PROGRESS NOTES
2000 Received report from [facility] and [facility] care. 2015 Morphine given for generalized lower abdominal pain per pt request.  2130 Zofran given for nausea per pt request. Labs drawn. 2345 Ativan given for CIWA 12, update provided to hospitalist about ciwa scale and prn order. 0530 Labs drawn. 0600 CIWA 10, ativan given, will continue to monitor. 0730 Bedside and Verbal shift change report given to Gi Hutson (oncoming nurse) by Jack Moya (offgoing nurse). Report included the following information SBAR, Kardex, Intake/Output, MAR and Recent Results.

## 2022-03-15 NOTE — ROUTINE PROCESS
Physical Therapy Screening:  Services are not indicated at this time. An InBasket screening referral was triggered for physical therapy based on results obtained during the nursing admission assessment. The patients chart was reviewed and the patient is not appropriate for a skilled therapy evaluation at this time. Please consult physical therapy if any therapy needs arise. Thank you.     Kamilah Jackson, PT

## 2022-03-15 NOTE — PROGRESS NOTES
Patient lost one of his IV access. Patient has banana bag and potassium ordered. I cannot give these medications together. RN tried and Charge RN tried to get an IV. Called CCU to try and get someone to hopefully try an IV, but nobody answered. WIll pass over to night shift.

## 2022-03-15 NOTE — PROGRESS NOTES
Bedside and Verbal shift change report given to SD Best (oncoming nurse) by Mariola Gray (offgoing nurse).  Report included the following information SBAR, Kardex, ED Summary, OR Summary, Procedure Summary, Intake/Output, MAR, Recent Results and Cardiac Rhythm ST.

## 2022-03-15 NOTE — PROGRESS NOTES
[address] [facility] Adult  Hospitalist Group                                                                                          Hospitalist Progress Note  Richar Hawley MD  Answering service: [phone] from in house phone        Date of Service:  3/15/2022  NAME:  Kelli Torres  :  1965  MRN:  [id number]      Admission Summary:   Kelli Torres is a 60 y.o. male who presents with nausea vomiting. Patient reports that he drinks heavy amount of alcohol on a daily basis has history of pancreatitis, last drink was around 430 yesterday, started having some nausea vomiting reports that he has noticed some coffee-ground emesis and noticed that his stool is dark, reports that he is also noticed some fresh blood in his vomitus, denies any fever or chills, denies any lightheaded dizziness or falls, patient came to the ER and was requested to be admitted to the hospitalist service, patient reports he supposed to take aspirin and Plavix on a daily basis       Interval history / Subjective:   Pt with elevated CIWA overnight 22-10, received 12 mg ativan in last 24 hours  No further episodes of vomiting. Oriented but mumbling his words      Assessment & Plan:     Coffee ground emesis  UGIB  Acute blood loss anemia  - H and H every 12 hours if additional bleeding, daily otherwise  - Transfuse for hemoglobin less than 7   - PPI IV BID  - GI consulted, plan EGD today   - NPO for now     Alcohol withdrawal - drinks 1L per day of [place] liquor. High risk of prolonged and severe withdrawal   - CIWA protocol   - PRN ativan for CIWA, patient also with some drug seeking behavior, should also look for objective signs of withdrawal     Likely alcoholic hepatitis - AST:ALT 2:1, improving.    Suspect cirrhosis - low PLT, elevated INR, slightly elevated ammonia  - Start lactulose once GI bleed ruled out   - Check INR in am   - Continue to monitor hepatic panel  - Would benefit from hepatology consult  - RUQ US with hepatic parenchymal disease    Hypokalemia - replace and monitor         Code status: FULL  Prophylaxis: SCDs, due to UGIB  Care Plan discussed with: pt, RN, and consultant   Anticipated Disposition: Likely greater than 48 hours     Hospital Problems  Never Reviewed          Codes Class Noted POA    GI bleed ICD-10-CM: K92.2  ICD-9-CM: 578.9  3/14/2022 Unknown                Review of Systems:   A comprehensive review of systems was negative except for that written in the HPI. Vital Signs:    Last 24hrs VS reviewed since prior progress note. Most recent are:  Visit Vitals  BP (!) 150/95   Pulse 93   Temp 99.8 °F (37.7 °C)   Resp 17   Ht 5' 9\" (1.753 m)   Wt 81.9 kg (180 lb 8.9 oz)   SpO2 98%   BMI 26.66 kg/m²         Intake/Output Summary (Last 24 hours) at 3/15/2022 1659  Last data filed at 3/15/2022 5760  Gross per 24 hour   Intake 1355.42 ml   Output 125 ml   Net 1230.42 ml        Physical Examination:     I had a face to face encounter with this patient and independently examined them on 3/15/2022 as outlined below:          Constitutional:  No acute distress, disheveled   ENT:  Oral mucosa moist, oropharynx benign. Resp:  CTA bilaterally. No wheezing/rhonchi/rales. No accessory muscle use. CV:  Regular rhythm, normal rate, no murmurs, gallops, rubs    GI:  Soft, non distended, non tender. normoactive bowel sounds, no hepatosplenomegaly     Musculoskeletal:  No edema, warm, 2+ pulses throughout    Neurologic:  Moves all extremities.   AAOx3 but slow to respond, mumbeling, CN II-XII reviewed            Data Review:    Review and/or order of clinical lab test  Review and/or order of tests in the radiology section of CPT  Review and/or order of tests in the medicine section of CPT      Labs:     Recent Labs     03/15/22  1225 03/15/22  0526 03/14/22  1434 03/14/22  0913   WBC  --  3.2*  --  5.9   HGB 10.5* 10.6*   < > 13.2   HCT  --  29.9*  --  37.9   PLT  --  48*  --  93*    < > = values in this interval not displayed. Recent Labs     03/15/22  0526 03/14/22  1434 03/14/22 0913   *  --  133*   K 2.8*  --  3.1*   CL 95*  --  91*   CO2 30  --  22   BUN 10  --  10   CREA 0.68*  --  0.94   *  --  293*   CA 7.3*  --  8.5   MG  --  1.3* 1.2*     Recent Labs     03/15/22  0526 03/14/22  1434 03/14/22  0913   ALT 63  --  104*   *  --  345*   TBILI 2.6*  --  2.3*   TP 5.9*  --  7.6   ALB 2.6*  --  3.5   GLOB 3.3  --  4.1*   LPSE  --  179 174     Recent Labs     03/14/22 1434   INR 1.2*   PTP 12.2*      No results for input(s): FE, TIBC, PSAT, FERR in the last 72 hours. Lab Results   Component Value Date/Time    Folate 28.0 (H) 03/14/2022 02:34 PM      No results for input(s): PH, PCO2, PO2 in the last 72 hours. No results for input(s): CPK, CKNDX, TROIQ in the last 72 hours.     No lab exists for component: CPKMB  No results found for: CHOL, CHOLX, CHLST, CHOLV, HDL, HDLP, LDL, LDLC, DLDLP, TGLX, TRIGL, TRIGP, CHHD, CHHDX  Lab Results   Component Value Date/Time    Glucose (POC) 225 (H) 03/15/2022 12:30 PM    Glucose (POC) 180 (H) 03/15/2022 06:05 AM    Glucose (POC) 193 (H) 03/14/2022 11:46 PM    Glucose (POC) 174 (H) 03/14/2022 06:22 PM     No results found for: COLOR, APPRN, SPGRU, REFSG, SAMUEL, PROTU, GLUCU, KETU, BILU, UROU, MAE, LEUKU, GLUKE, EPSU, BACTU, WBCU, RBCU, CASTS, UCRY      Medications Reviewed:     Current Facility-Administered Medications   Medication Dose Route Frequency    LORazepam (ATIVAN) injection 2 mg  2 mg IntraVENous Q1H PRN    LORazepam (ATIVAN) injection 4 mg  4 mg IntraVENous Q1H PRN    acetaminophen (TYLENOL) tablet 650 mg  650 mg Oral Q4H PRN    0.9% sodium chloride infusion  50 mL/hr IntraVENous CONTINUOUS    sodium chloride (NS) flush 5-40 mL  5-40 mL IntraVENous Q8H    sodium chloride (NS) flush 5-40 mL  5-40 mL IntraVENous PRN    midazolam (VERSED) injection 0.25-5 mg  0.25-5 mg IntraVENous Multiple    fentaNYL citrate (PF) injection  mcg   mcg IntraVENous Multiple    naloxone (NARCAN) injection 0.4 mg  0.4 mg IntraVENous Multiple    flumazeniL (ROMAZICON) 0.1 mg/mL injection 0.2 mg  0.2 mg IntraVENous Multiple    simethicone (MYLICON) 32KD/0.9EE oral drops 80 mg  1.2 mL Oral Multiple    atropine injection 0.5 mg  0.5 mg IntraVENous ONCE PRN    EPINEPHrine (ADRENALIN) 0.1 mg/mL syringe 1 mg  1 mg Endoscopically ONCE PRN    potassium chloride 10 mEq in 50 ml IVPB  10 mEq IntraVENous Q1H    carvediloL (COREG) tablet 25 mg  25 mg Oral BID WITH MEALS    sodium chloride (NS) flush 5-40 mL  5-40 mL IntraVENous Q8H    sodium chloride (NS) flush 5-40 mL  5-40 mL IntraVENous PRN    0.9% sodium chloride 1,386 mL with folic acid 1 mg, thiamine 100 mg, mvi (adult no. 4 with vit K) 10 mL infusion   IntraVENous Q24H    0.9% sodium chloride infusion  100 mL/hr IntraVENous CONTINUOUS    ondansetron (ZOFRAN) injection 4 mg  4 mg IntraVENous Q4H PRN    pantoprazole (PROTONIX) injection 40 mg  40 mg IntraVENous Q12H    And    sodium chloride (NS) flush 10 mL  10 mL IntraVENous Q12H    glucose chewable tablet 16 g  4 Tablet Oral PRN    dextrose 10 % infusion 0-250 mL  0-250 mL IntraVENous PRN    glucagon (GLUCAGEN) injection 1 mg  1 mg IntraMUSCular PRN    insulin lispro (HUMALOG) injection   SubCUTAneous Q6H     Facility-Administered Medications Ordered in Other Encounters   Medication Dose Route Frequency    propofoL (DIPRIVAN) 10 mg/mL injection   IntraVENous PRN    lidocaine (PF) (XYLOCAINE) 20 mg/mL (2 %) injection   IntraVENous PRN    0.9% sodium chloride infusion   IntraVENous CONTINUOUS     ______________________________________________________________________  EXPECTED LENGTH OF STAY: 3d 0h  ACTUAL LENGTH OF STAY:          1                 Ryan Jones MD

## 2022-03-15 NOTE — CONSULTS
[address] [facility] Rosa Lees NOTE  [facility] office  [phone] NP in-hospital cell phone M-F until 4:30  After 5pm or on weekends, please call  for physician on call        NAME:  Sharmila Galvez   :   1965   MRN:   [id number]       Referring Physician: Keshia Fleming    Consult Date: 3/15/2022 10:45 AM    Chief Complaint: coffee ground emesis, alcohol abuse     History of Present Illness:  Patient is a 60 y.o. who is seen in consultation at the request of Dr. Judith Soto for coffee ground emesis, alcohol abuse. Medical history as listed below includes alcohol abuse and pancreatitis. He presented for nausea and coffee-ground emesis and abdominal pain. He reports nausea resolved, having pain that he believes is similar to prior episode of pancreatitis. Denies NSAID's. +alcohol. Reports history of EGD/colonscopy unsure when. I have reviewed the emergency room note, hospital admission note, notes by all other clinicians who have seen the patient during this hospitalization to date. I have reviewed the problem list and the reason for this hospitalization. I have reviewed the allergies and the medications the patient was taking at home prior to this hospitalization. PMH:  No past medical history on file. PSH:  No past surgical history on file. Allergies: Allergies   Allergen Reactions    Penicillins Anaphylaxis       Home Medications:  Prior to Admission Medications   Prescriptions Last Dose Informant Patient Reported? Taking?   aspirin 81 mg chewable tablet 3/12/2022  Yes Yes   Sig: Take 81 mg by mouth daily. carvediloL (Coreg) 25 mg tablet 3/12/2022  Yes Yes   Sig: Take 25 mg by mouth two (2) times daily (with meals). clopidogreL (Plavix) 75 mg tab 3/12/2022  Yes Yes   Sig: Take 75 mg by mouth daily.    insulin glargine (Lantus U-100 Insulin) 100 unit/mL injection 3/12/2022  Yes Yes Si Units by SubCUTAneous route daily. insulin lispro (HumaLOG KwikPen Insulin) 100 unit/mL kwikpen 3/7/2022  Yes Yes   Si Units by SubCUTAneous route Before breakfast, lunch, and dinner. lisinopriL (PRINIVIL, ZESTRIL) 20 mg tablet 3/12/2022  Yes Yes   Sig: Take 20 mg by mouth two (2) times a day. multivitamin-iron-FA, hematinic, (Cerovite Advanced Formula)  mg-mcg tab tablet 3/12/2022  Yes Yes   Sig: Take 1 Tablet by mouth daily. omeprazole (PRILOSEC) 20 mg capsule 3/12/2022  Yes Yes   Sig: Take 20 mg by mouth Daily (before breakfast). Facility-Administered Medications: None       Hospital Medications:  Current Facility-Administered Medications   Medication Dose Route Frequency    LORazepam (ATIVAN) injection 2 mg  2 mg IntraVENous Q1H PRN    LORazepam (ATIVAN) injection 4 mg  4 mg IntraVENous Q1H PRN    acetaminophen (TYLENOL) tablet 650 mg  650 mg Oral Q4H PRN    carvediloL (COREG) tablet 25 mg  25 mg Oral BID WITH MEALS    sodium chloride (NS) flush 5-40 mL  5-40 mL IntraVENous Q8H    sodium chloride (NS) flush 5-40 mL  5-40 mL IntraVENous PRN    0.9% sodium chloride 6,284 mL with folic acid 1 mg, thiamine 100 mg, mvi (adult no. 4 with vit K) 10 mL infusion   IntraVENous Q24H    0.9% sodium chloride infusion  100 mL/hr IntraVENous CONTINUOUS    ondansetron (ZOFRAN) injection 4 mg  4 mg IntraVENous Q4H PRN    pantoprazole (PROTONIX) injection 40 mg  40 mg IntraVENous Q12H    And    sodium chloride (NS) flush 10 mL  10 mL IntraVENous Q12H    glucose chewable tablet 16 g  4 Tablet Oral PRN    dextrose 10 % infusion 0-250 mL  0-250 mL IntraVENous PRN    glucagon (GLUCAGEN) injection 1 mg  1 mg IntraMUSCular PRN    insulin lispro (HUMALOG) injection   SubCUTAneous Q6H       Social History:  Social History     Tobacco Use    Smoking status: Not on file    Smokeless tobacco: Not on file   Substance Use Topics    Alcohol use: Not on file       Family History:  No family history on file. Review of Systems:  Constitutional: negative fever, negative chills, negative weight loss  Eyes:   negative visual changes  ENT:   negative sore throat, tongue or lip swelling  Respiratory:  negative cough, negative dyspnea  Cards:  negative for chest pain, palpitations, lower extremity edema  GI:   See HPI  :  negative for frequency, dysuria  Integument:  negative for rash and pruritus  Heme:  +for easy bruising and gum/nose bleeding  Musculoskeletal:+for myalgias, back pain and muscle weakness  Neuro:  +for headaches, dizziness, vertigo  Psych: negative for feelings of anxiety, depression     Objective:     Patient Vitals for the past 8 hrs:   BP Temp Pulse Resp SpO2   03/15/22 1000   87     03/15/22 0832 (!) 129/98 98.8 °F (37.1 °C) 96 18 96 %   03/15/22 0600   86     03/15/22 0400   90     03/15/22 0334 (!) 147/90 98.1 °F (36.7 °C) 94 20 96 %     No intake/output data recorded. 03/13 1901 - 03/15 0700  In: 1355.4 [I.V.:1355.4]  Out: 125 [Urine:125]    EXAM:     CONST:  Pleasant male lying in bed, no acute distress   NEURO:  alert and oriented x 3   HEENT: EOMI, no scleral icterus   LUNGS: No increased WOB   CARD:  Regular rate   ABD:  soft, +tenderness, no rebound, no masses, non distended   EXT:  no edema, warm   PSYCH: full, not anxious     Data Review     Recent Labs     03/15/22  0526 03/14/22  2128 03/14/22  1434 03/14/22  0913   WBC 3.2*  --   --  5.9   HGB 10.6* 10.8*   < > 13.2   HCT 29.9*  --   --  37.9   PLT 48*  --   --  93*    < > = values in this interval not displayed.      Recent Labs     03/15/22  0526 03/14/22 0913   * 133*   K 2.8* 3.1*   CL 95* 91*   CO2 30 22   BUN 10 10   CREA 0.68* 0.94   * 293*   CA 7.3* 8.5     Recent Labs     03/15/22  0526 03/14/22  1434 03/14/22  0913   *  --  345*   TP 5.9*  --  7.6   ALB 2.6*  --  3.5   GLOB 3.3  --  4.1*   LPSE  --  179 174     Recent Labs     03/14/22  1434   INR 1.2*   PTP 12.2* IMPRESSION  Marked hepatic steatosis. No acute abnormality. Punctate nonobstructing right  renal stones. IMPRESSION  1. Hepatic parenchymal disease. 2.   Several punctate nonobstructing right renal calculi seen on prior CT not as  conspicuous sonographically. Assessment:     · GI bleed: hgb 13.2-->10.6, plt 48, INR 1.2  · Pancytopenia   · Alcohol withdraw   · Plavix PTA (patient states he hasn't taken in over a week)     Patient Active Problem List   Diagnosis Code    GI bleed K92.2     Plan:     · CLD  · Hold anticoagulation as able  · Agree with BID PPI  · Trend CBC, transfuse as necessary  · Recommended EGD, patient refusing currently - will sign off please call back if agreeable and appropriate for EGD  · Patient discussed with Dr. Quincy Ruvalcaba  · Thank you for allowing me to participate in care of [facility]     Signed By: Tawana Gates NP     3/15/2022  10:45 AM       GI Attending: Patient has changed his mind and would like to proceed with EGD today. Procedure details discussed and risks discussed. He is in agreement to proceed. Gigi Ruvalcaba MD

## 2022-03-15 NOTE — PROGRESS NOTES
[facility]  1965  [id number]    Situation:  Verbal report received from: esmer Dumont RN  Procedure: Procedure(s):  ESOPHAGOGASTRODUODENOSCOPY (EGD)  ESOPHAGOGASTRODUODENAL (EGD) BIOPSY    Background:    Preoperative diagnosis: Hematemesis  Postoperative diagnosis: erosive esophagitis    :  Dr. Aditya Steinberg  Assistant(s): Endoscopy Technician-1: Quitman Lefort  Endoscopy RN-1: Sandra Samuels RN    Specimens: * No specimens in log *  H. Pylori  no    Anesthesia gave intra-procedure sedation and medications, see anesthesia flow sheet yes    Intravenous fluids: NS@ KVO     Vital signs stable     Abdominal assessment: round and soft     Recommendation:  Return to floor

## 2022-03-15 NOTE — PROGRESS NOTES
TRANSFER - OUT REPORT:    Verbal report given to ARIEL BEAVER(name) on Shana Sacks  being transferred to 649(unit) for ordered procedure       Report consisted of patients Situation, Background, Assessment and   Recommendations(SBAR). Information from the following report(s) SBAR was reviewed with the receiving nurse. Lines:   Peripheral IV 03/14/22 Right Antecubital (Active)   Site Assessment Clean, dry, & intact 03/15/22 1205   Phlebitis Assessment 0 03/15/22 1205   Infiltration Assessment 0 03/15/22 1205   Dressing Status Clean, dry, & intact 03/15/22 1205   Dressing Type Transparent 03/15/22 1205   Hub Color/Line Status End cap changed; Flushed;Patent;Pink 03/15/22 1205   Action Taken Open ports on tubing capped 03/15/22 1205   Alcohol Cap Used Yes 03/15/22 1205       Peripheral IV 03/14/22 Left;Posterior Hand (Active)   Site Assessment Clean, dry, & intact 03/15/22 1205   Phlebitis Assessment 0 03/15/22 1205   Infiltration Assessment 0 03/15/22 1205   Dressing Status Clean, dry, & intact 03/15/22 1205   Dressing Type Transparent 03/15/22 1205   Hub Color/Line Status Blue; Infusing 03/15/22 1205   Action Taken Open ports on tubing capped 03/15/22 1205   Alcohol Cap Used Yes 03/15/22 1205        Opportunity for questions and clarification was provided.       Patient transported with:   [employer]

## 2022-03-15 NOTE — PROGRESS NOTES
TRANSFER - IN REPORT:    Verbal report received from [facility]) on Mary Ann Hernandez  being received from 649(unit) for ordered procedure      Report consisted of patients Situation, Background, Assessment and   Recommendations(SBAR). Information from the following report(s) SBAR was reviewed with the receiving nurse. Opportunity for questions and clarification was provided. Assessment completed upon patients arrival to unit and care assumed.

## 2022-03-15 NOTE — PROCEDURES
[address] (EGD) Procedure Note    Jessie Espitia  1965  [id number]      Procedure: Endoscopic Gastroduodenoscopy --diagnostic    Indication:  Hematemesis     Pre-operative Diagnosis: see indication above    Post-operative Diagnosis: see findings below    : Kenzie Esquivel MD    Staff: Endoscopy Technician-1: Simba Hernandez  Endoscopy RN-1: Wilian Jensen RN     Referring Provider:  None      Anesthesia/Sedation:  MAC anesthesia Propofol        Procedure Details     After informed consent was obtained for the procedure, with all risks and benefits of procedure explained the patient was taken to the endoscopy suite and placed in the left lateral decubitus position. Following sequential administration of sedation as per above, the endoscope was inserted into the mouth and advanced under direct vision to second portion of the duodenum. A careful inspection was made as the gastroscope was withdrawn, including a retroflexed view of the proximal stomach; findings and interventions are described below. Findings:   Esophagus: LA Grade C erosive esophagitis in the distal esophagus - biopsies deferred in setting of recent Plavix dosing  Stomach: normal  Duodenum: normal      Therapies:  none    Specimens: none         EBL: None      Complications:   None; patient tolerated the procedure well. Impression:    As above    Recommendations:  1. GI lite diet  2. Pantoprazole 40 mg BID for two months  3. Carafate 1 g QID for 14 days  4. Continue to hold Plavix for 3-5 days if able. 5. Follow up in 2 months with plan for repeat EGD at that time  6. Will sign off for now. Please call with any questions    Signed By: Kenzie Esquivel MD     3/15/2022  5:02 PM

## 2022-03-16 VITALS
WEIGHT: 183.64 LBS | OXYGEN SATURATION: 98 % | TEMPERATURE: 98 F | DIASTOLIC BLOOD PRESSURE: 73 MMHG | HEART RATE: 90 BPM | SYSTOLIC BLOOD PRESSURE: 113 MMHG | HEIGHT: 69 IN | RESPIRATION RATE: 19 BRPM | BODY MASS INDEX: 27.2 KG/M2

## 2022-03-16 PROBLEM — F10.939 ALCOHOL WITHDRAWAL (HCC): Status: ACTIVE | Noted: 2022-03-16

## 2022-03-16 LAB
ANION GAP SERPL CALC-SCNC: 6 MMOL/L (ref 5–15)
BASOPHILS # BLD: 0 K/UL (ref 0–0.1)
BASOPHILS NFR BLD: 1 % (ref 0–1)
BUN SERPL-MCNC: 7 MG/DL (ref 6–20)
BUN/CREAT SERPL: 11 (ref 12–20)
CALCIUM SERPL-MCNC: 7.8 MG/DL (ref 8.5–10.1)
CHLORIDE SERPL-SCNC: 101 MMOL/L (ref 97–108)
CO2 SERPL-SCNC: 26 MMOL/L (ref 21–32)
CREAT SERPL-MCNC: 0.64 MG/DL (ref 0.7–1.3)
DIFFERENTIAL METHOD BLD: ABNORMAL
EOSINOPHIL # BLD: 0 K/UL (ref 0–0.4)
EOSINOPHIL NFR BLD: 1 % (ref 0–7)
ERYTHROCYTE [DISTWIDTH] IN BLOOD BY AUTOMATED COUNT: 15.7 % (ref 11.5–14.5)
GLUCOSE BLD STRIP.AUTO-MCNC: 189 MG/DL (ref 65–117)
GLUCOSE BLD STRIP.AUTO-MCNC: 277 MG/DL (ref 65–117)
GLUCOSE BLD STRIP.AUTO-MCNC: 385 MG/DL (ref 65–117)
GLUCOSE SERPL-MCNC: 187 MG/DL (ref 65–100)
HCT VFR BLD AUTO: 31 % (ref 36.6–50.3)
HGB BLD-MCNC: 11 G/DL (ref 12.1–17)
HGB BLD-MCNC: 11.1 G/DL (ref 12.1–17)
IMM GRANULOCYTES # BLD AUTO: 0 K/UL (ref 0–0.04)
IMM GRANULOCYTES NFR BLD AUTO: 1 % (ref 0–0.5)
LYMPHOCYTES # BLD: 0.9 K/UL (ref 0.8–3.5)
LYMPHOCYTES NFR BLD: 25 % (ref 12–49)
MCH RBC QN AUTO: 33.7 PG (ref 26–34)
MCHC RBC AUTO-ENTMCNC: 35.8 G/DL (ref 30–36.5)
MCV RBC AUTO: 94.2 FL (ref 80–99)
MONOCYTES # BLD: 0.3 K/UL (ref 0–1)
MONOCYTES NFR BLD: 7 % (ref 5–13)
NEUTS SEG # BLD: 2.5 K/UL (ref 1.8–8)
NEUTS SEG NFR BLD: 65 % (ref 32–75)
NRBC # BLD: 0.04 K/UL (ref 0–0.01)
NRBC BLD-RTO: 1.1 PER 100 WBC
PLATELET # BLD AUTO: 47 K/UL (ref 150–400)
PMV BLD AUTO: 10.8 FL (ref 8.9–12.9)
POTASSIUM SERPL-SCNC: 3 MMOL/L (ref 3.5–5.1)
RBC # BLD AUTO: 3.29 M/UL (ref 4.1–5.7)
RBC MORPH BLD: ABNORMAL
SERVICE CMNT-IMP: ABNORMAL
SODIUM SERPL-SCNC: 133 MMOL/L (ref 136–145)
WBC # BLD AUTO: 3.7 K/UL (ref 4.1–11.1)

## 2022-03-16 PROCEDURE — 36415 COLL VENOUS BLD VENIPUNCTURE: CPT

## 2022-03-16 PROCEDURE — 74011250637 HC RX REV CODE- 250/637: Performed by: INTERNAL MEDICINE

## 2022-03-16 PROCEDURE — 74011250637 HC RX REV CODE- 250/637: Performed by: FAMILY MEDICINE

## 2022-03-16 PROCEDURE — 74011636637 HC RX REV CODE- 636/637: Performed by: INTERNAL MEDICINE

## 2022-03-16 PROCEDURE — 80048 BASIC METABOLIC PNL TOTAL CA: CPT

## 2022-03-16 PROCEDURE — 74011636637 HC RX REV CODE- 636/637: Performed by: FAMILY MEDICINE

## 2022-03-16 PROCEDURE — 74011250636 HC RX REV CODE- 250/636: Performed by: FAMILY MEDICINE

## 2022-03-16 PROCEDURE — 82962 GLUCOSE BLOOD TEST: CPT

## 2022-03-16 PROCEDURE — 74011000250 HC RX REV CODE- 250: Performed by: FAMILY MEDICINE

## 2022-03-16 PROCEDURE — 94760 N-INVAS EAR/PLS OXIMETRY 1: CPT

## 2022-03-16 PROCEDURE — 74011250636 HC RX REV CODE- 250/636: Performed by: NURSE PRACTITIONER

## 2022-03-16 PROCEDURE — 85018 HEMOGLOBIN: CPT

## 2022-03-16 PROCEDURE — 74011000250 HC RX REV CODE- 250: Performed by: INTERNAL MEDICINE

## 2022-03-16 PROCEDURE — 85025 COMPLETE CBC W/AUTO DIFF WBC: CPT

## 2022-03-16 RX ORDER — INSULIN LISPRO 100 [IU]/ML
10 INJECTION, SOLUTION INTRAVENOUS; SUBCUTANEOUS ONCE
Status: COMPLETED | OUTPATIENT
Start: 2022-03-16 | End: 2022-03-16

## 2022-03-16 RX ORDER — PANTOPRAZOLE SODIUM 40 MG/1
40 TABLET, DELAYED RELEASE ORAL
Qty: 60 TABLET | Refills: 0 | Status: SHIPPED | OUTPATIENT
Start: 2022-03-16 | End: 2022-04-15

## 2022-03-16 RX ADMIN — SUCRALFATE 1 G: 1 TABLET ORAL at 12:30

## 2022-03-16 RX ADMIN — PANTOPRAZOLE SODIUM 40 MG: 40 TABLET, DELAYED RELEASE ORAL at 06:42

## 2022-03-16 RX ADMIN — CARVEDILOL 25 MG: 12.5 TABLET, FILM COATED ORAL at 09:08

## 2022-03-16 RX ADMIN — Medication 10 UNITS: at 13:16

## 2022-03-16 RX ADMIN — Medication 10 ML: at 06:43

## 2022-03-16 RX ADMIN — ONDANSETRON HYDROCHLORIDE 4 MG: 2 SOLUTION INTRAMUSCULAR; INTRAVENOUS at 09:08

## 2022-03-16 RX ADMIN — SUCRALFATE 1 G: 1 TABLET ORAL at 06:42

## 2022-03-16 RX ADMIN — Medication 2 UNITS: at 00:10

## 2022-03-16 RX ADMIN — SODIUM CHLORIDE, PRESERVATIVE FREE 10 ML: 5 INJECTION INTRAVENOUS at 06:42

## 2022-03-16 RX ADMIN — LORAZEPAM 2 MG: 2 INJECTION INTRAMUSCULAR; INTRAVENOUS at 09:08

## 2022-03-16 NOTE — PROGRESS NOTES
Hospital follow-up new PCP transitional care appointment has been scheduled with Dr. Josef Christine for Wednesday, 3/23/22 at 11:15 a.m. Pending patient discharge. Torey Jordan, Care Management Assistant.

## 2022-03-16 NOTE — PROGRESS NOTES
Reason for Admission:  Nausea vomiting                     RUR Score:    10% low                 Plan for utilizing home health:     No needs     PCP: First and Last name:  None     Name of Practice:    Are you a current patient: Yes/No:    Approximate date of last visit:    Can you participate in a virtual visit with your PCP:                   *Will provide [facility] lat[facility]*  Current Advanced Directive/Advance Care Plan: Full Code      Healthcare Decision Maker:  NA  Click here to complete [address] including selection of the Healthcare Decision Maker Relationship (ie \"Primary\")                             Transition of Care Plan:     CM met with patient to inform of CM role and to assess needs. 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 He reports that he recently moved to [place] to his brother and sister in-law's home (one story, 3 steps to enter) from travels across [facility], [place], [place], and [place]. Patient states that his alcoholism has gotten worse within the last 5 years in which he attributes to staying isolated from others due to the nature of his job. He reports that he participated in [employer] meetings which he finds helpful and has also spent 28 days at [facility] in [place], [place] back in Oct. 21. Patient shared that he is recently  as well. Patient states that he was recently in the ICU in [facility] about a month ago. He has three children-two live in [place] and one in [place]. Patient plans to DC to his brother's home and resume with AAA meetings. CM provided financial assistance application and [facility] for PCP follow up. Family should be able to provide transport home at DC which is planned for 3/17. CM to monitor.      Terell Miller MS

## 2022-03-16 NOTE — DISCHARGE INSTRUCTIONS
Dear Jessie Espitia,    Thank you for choosing [address] [place] [address] for your healthcare needs. We strive to provide EXCELLENT care to you and your family. In an effort to explain clearly why you were here in the hospital, I've also written a very brief summary below. Other details including formal diagnosis, medication changes, and follow up appointment recommendations can also be found in this packet. You were admitted for bleeding due to gastritis from alcohol for which you were started IV pantoprazole. You also received care from specialist physicians in the following specialties:  [facility]    Here are the updates to your medication list:  **PPI BID pantoprazole     Remember that it is important for you to take your medications exactly as they are prescribed. It is helpful to keep a list of your medication with the names, dosages, and times to be taken in your wallet. Additionally,   - Please make sure to follow up with your primary care physician within 1-2 weeks of discharge for hospital follow up. You should also follow up with a liver doctor as you have some sings of alcoholic injury to your liver  - Please make sure to continue to monitor for: Chest pain, shortness of breath, high fevers, bleeding and return to the Emergency Department with any of these symptoms.   - Please get up slowly from a seated or laying position, avoid falls. - Avoid tobacco, alcohol and other illicit drug use. - Diet restrictions: None  - Activity restrictions: None  - Wound care: None  - Monitor for alcohol withdrawal, hallucinations, tremors, and worsening confusion please come in to the hospital.       Make sure to also see your primary care doctor for follow-up. Bring these papers with you and be sure to review your medication list with your doctor. I cannot stress the importance of follow up enough.  I've included the information for your follow-up appointments below:    Follow-up Information     Follow up With Specialties Details Why Jose Osuna 89, [facility], DO Internal Medicine On 3/23/2022 Hospital follow up new PCP appointment Wednesday, 3/23/22 at 11:15 a.m. Please arrive 15 minutes early with photo ID, insurance card and a listing of all medications. [address]  [phone]            At this time, the following test results are still pending: None  Again, please follow-up these results with your primary care provider. Should you have any fever over 101 degrees for 24 hours, chest pain, shortness of breath, fever, chills, nausea, vomiting, diarrhea, change in mentation, falling, weakness, bleeding, or worsening pain, please seek medical attention immediately. Finally, as your discharging physician, you may be receiving a survey regarding my care. I would greatly value and appreciate your input in the survey as we strive for excellence. If you have any questions, I can be reached at [phone].   Thank you so much again for allowing me to care for you at [facility].    Respectfully yours,  James Harden MD

## 2022-03-16 NOTE — ANESTHESIA POSTPROCEDURE EVALUATION
Procedure(s):  ESOPHAGOGASTRODUODENOSCOPY (EGD)  ESOPHAGOGASTRODUODENAL (EGD) BIOPSY. MAC    Anesthesia Post Evaluation        Patient location during evaluation: PACU  Note status: Adequate. Level of consciousness: responsive to verbal stimuli and sleepy but conscious  Pain management: satisfactory to patient  Airway patency: patent  Anesthetic complications: no  Cardiovascular status: acceptable  Respiratory status: acceptable  Hydration status: acceptable  Comments: +Post-Anesthesia Evaluation and Assessment    Patient: Duncan Sanchez MRN: [id number]  SSN: [id number]   YOB: 1965  Age: 60 y.o. Sex: male          Cardiovascular Function/Vital Signs    BP (!) 151/103 (BP 1 Location: Left upper arm, BP Patient Position: At rest)   Pulse 89   Temp 36.7 °C (98 °F)   Resp 21   Ht 5' 9\" (1.753 m)   Wt 83.3 kg (183 lb 10.3 oz)   SpO2 96%   BMI 27.12 kg/m²     Patient is status post Procedure(s):  ESOPHAGOGASTRODUODENOSCOPY (EGD)  ESOPHAGOGASTRODUODENAL (EGD) BIOPSY. Nausea/Vomiting: Controlled. Postoperative hydration reviewed and adequate. Pain:  Pain Scale 1: Numeric (0 - 10) (03/16/22 0855)  Pain Intensity 1: 0 (03/16/22 0855)   Managed. Neurological Status: At baseline. Mental Status and Level of Consciousness: Arousable. Pulmonary Status:   O2 Device: None (Room air) (03/16/22 0352)   Adequate oxygenation and airway patent. Complications related to anesthesia: None    Post-anesthesia assessment completed. No concerns. I have evaluated the patient and the patient is stable and ready to be discharged from PACU .     Signed By: Curtis Alston MD    3/16/2022        INITIAL Post-op Vital signs:   Vitals Value Taken Time   /103 03/16/22 0855   Temp 36.7 °C (98 °F) 03/16/22 0855   Pulse 89 03/16/22 1212   Resp 21 03/16/22 0855   SpO2 96 % 03/16/22 0855

## 2022-03-16 NOTE — PROGRESS NOTES
Attempted to get Hemoglobin twice. After second attempt, patient refused any more attempts. States \"i've been stuck so many times today, I just cant do it anymore. \" informed patient that I would need to get more labs in the morning between 00:00-04:00. He agreed to getting all needed labs drawn at that time.

## 2022-03-16 NOTE — DISCHARGE SUMMARY
Discharge Summary       PATIENT ID: Lisbeth Brian  MRN: [id number]   YOB: 1965    DATE OF ADMISSION: 3/14/2022  8:48 AM    DATE OF DISCHARGE: 03/16/22      PRIMARY CARE PROVIDER: None     DISCHARGING PROVIDER: Josue Calderon MD    To contact this individual call [phone] and ask the  to page. If unavailable ask to be transferred the Adult Hospitalist Department. CONSULTATIONS: IP CONSULT TO HOSPITALIST  IP CONSULT TO GASTROENTEROLOGY    PROCEDURES/SURGERIES: Procedure(s):  ESOPHAGOGASTRODUODENOSCOPY (EGD)  ESOPHAGOGASTRODUODENAL (EGD) BIOPSY    DISCHARGE DIAGNOSES:   Gastritis  Alcohol withdrawal   Alcohol abuse           ADMISSION SUMMARY AND HOSPITAL COURSE:   Ras Morrison a 60 y. o. male who presents with nausea vomiting. Patient reports that he drinks heavy amount of alcohol on a daily basis has history of pancreatitis, last drink was around 430 yesterday, started having some nausea vomiting reports that he has noticed some coffee-ground emesis and noticed that his stool is dark, reports that he is also noticed some fresh blood in his vomitus, denies any fever or chills, denies any lightheaded dizziness or falls, patient came to the ER and was requested to be admitted to the hospitalist service, patient reports he supposed to take aspirin and Plavix on a daily basis    Patient was admitted and monitored. Hemoglobin dropped from 13-->10, and he was taken for EGD 3/15, which showed erosive gastritis. No varices. Pt hemoglobin remained stable. CIWA low, and was given 2mg ativan in last 24 hours, which he got for subjective findings. Has been up and walking the hallways without any issues. Not confused. Reviewed signs of alcohol withdrawal if he were to continue avoid drinking. Pt expressed understanding. Stable for DC home.      DISCHARGE DIAGNOSES / PLAN:      Coffee ground emesis  UGIB  Acute blood loss anemia  - Hemolgobin stable, 11 today   - PPI PO BID x 30 days   - GI consulted, EGD 3/15 with gastritis      Alcohol withdrawal - drinks 1L per day of [employer] [place] liquor. High risk of prolonged and severe withdrawal   - CIWA protocol    - Has not needed ativan in close to 8 hours, 12 hours piror to that. CIWA low, and no sign of alcohol withdrawal.      Likely alcoholic hepatitis - AST:ALT 2:1, improving. Suspect cirrhosis - low PLT, elevated INR, slightly elevated ammonia  - Start lactulose once GI bleed ruled out   - Check INR in am   - Continue to monitor hepatic panel  - Would benefit from hepatology consult  - RUQ US with hepatic parenchymal disease     Hypokalemia - replace and monitor     BMI: Body mass index is 27.12 kg/m². . This patient: Meets criteria for overweight given BMI >/= 25 and < 30 due to excess calories/nutritional. Weight loss and lifestyle modifications should be encouraged as an outpatient. PENDING TEST RESULTS:   At the time of discharge the following test results are still pending: None     ADDITIONAL CARE RECOMMENDATIONS:   - Please take all medications as prescribed. Note changes as below. **PPI BID pantoprazole   - Please make sure to follow up with your primary care physician within 1-2 weeks of discharge for hospital follow up. You should also follow up with a liver doctor as you have some sings of alcoholic injury to your liver  - Please make sure to continue to monitor for: Chest pain, shortness of breath, high fevers, bleeding and return to the Emergency Department with any of these symptoms.   - Please get up slowly from a seated or laying position, avoid falls. - Avoid tobacco, alcohol and other illicit drug use. - Diet restrictions: None  - Activity restrictions: None  - Wound care: None  - Monitor for alcohol withdrawal, hallucinations, tremors, and worsening confusion please come in to the hospital.         NOTIFY [address] FOLLOWING:   Fever over 101 degrees for 24 hours.    Chest pain, shortness of breath, fever, chills, nausea, vomiting, diarrhea, change in mentation, falling, weakness, bleeding. Severe pain or pain not relieved by medications, as well as any other signs or symptoms that you may have questions about. FOLLOW UP APPOINTMENTS:    Follow-up Information     Follow up With Specialties Details Why Contact Bharathi Smith DO Internal Medicine On 3/23/2022 Hospital follow up new PCP appointment Wednesday, 3/23/22 at 11:15 a.m. Please arrive 15 minutes early with photo ID, insurance card and a listing of all medications. [address] [place] [address] [address]  [phone]                DIET: Regular Diet    ACTIVITY: Activity as tolerated    EQUIPMENT needed: None    DISCHARGE MEDICATIONS:  Current Discharge Medication List      START taking these medications    Details   pantoprazole (PROTONIX) 40 mg tablet Take 1 Tablet by mouth Before breakfast and dinner for 30 days. Qty: 60 Tablet, Refills: 0  Start date: 3/16/2022, End date: 4/15/2022         CONTINUE these medications which have NOT CHANGED    Details   aspirin 81 mg chewable tablet Take 81 mg by mouth daily. carvediloL (Coreg) 25 mg tablet Take 25 mg by mouth two (2) times daily (with meals). clopidogreL (Plavix) 75 mg tab Take 75 mg by mouth daily. insulin glargine (Lantus U-100 Insulin) 100 unit/mL injection 16 Units by SubCUTAneous route daily. insulin lispro (HumaLOG KwikPen Insulin) 100 unit/mL kwikpen 4 Units by SubCUTAneous route Before breakfast, lunch, and dinner. lisinopriL (PRINIVIL, ZESTRIL) 20 mg tablet Take 20 mg by mouth two (2) times a day. multivitamin-iron-FA, hematinic, (Cerovite Advanced Formula)  mg-mcg tab tablet Take 1 Tablet by mouth daily.          STOP taking these medications       omeprazole (PRILOSEC) 20 mg capsule Comments:   Reason for Stopping:               DISPOSITION:  X  Home With:   OT  PT  HH  RN       Long term SNF/Inpatient Rehab Independent/assisted living    Hospice    Other:       PATIENT CONDITION AT DISCHARGE:     Functional status    Poor     Deconditioned    X Independent      Cognition   X  Lucid     Forgetful     Dementia      Catheters/lines (plus indication)    [place]     PICC     PEG    X None      Code status    X Full code     DNR      PHYSICAL EXAMINATION AT DISCHARGE:  Visit Vitals  /73 (BP 1 Location: Left upper arm, BP Patient Position: At rest)   Pulse 90   Temp 98 °F (36.7 °C)   Resp 19   Ht 5' 9\" (1.753 m)   Wt 83.3 kg (183 lb 10.3 oz)   SpO2 98%   BMI 27.12 kg/m²     Gen: NAD, awake in bed, no tremors,   HEENT: NC/AT, sclera anicteric, PERRL, EOMI  CV: RRR no m/r/g, normal S1 and S2, no pedal edema   Resp: CTA b/l no increased work of breathing, no wheezing or rhonchi, speaking in full sentences   Abd: NT/ND, normal bowel sounds, no rebound or guarding  Ext: 2+ pulses, no edema  Skin: No rashes or lesions. Neuro: alert and oriented, no focal deficits. No hallucinations.          CHRONIC MEDICAL DIAGNOSES:  Problem List as of 3/16/2022 Date Reviewed: 3/16/2022          Codes Class Noted - Resolved    Alcohol withdrawal ([facility] ICD-10-CM: N05.353  ICD-9-CM: 291.81  3/16/2022 - Present        GI bleed ICD-10-CM: K92.2  ICD-9-CM: 578.9  3/14/2022 - Present              Greater than 30 minutes were spent with the patient on counseling and coordination of care    Signed:   Kristel Randall MD  3/16/2022  4:34 PM

## 2022-03-19 PROBLEM — K92.2 GI BLEED: Status: ACTIVE | Noted: 2022-03-14

## 2022-03-24 PROBLEM — F10.939 ALCOHOL WITHDRAWAL (HCC): Status: ACTIVE | Noted: 2022-03-16

## (undated) DEVICE — FORCEPS BX L240CM JAW DIA2.8MM L CAP W/ NDL MIC MESH TOOTH

## (undated) DEVICE — TUBING HYDR IRR --